# Patient Record
Sex: FEMALE | URBAN - METROPOLITAN AREA
[De-identification: names, ages, dates, MRNs, and addresses within clinical notes are randomized per-mention and may not be internally consistent; named-entity substitution may affect disease eponyms.]

---

## 2018-07-02 ENCOUNTER — APPOINTMENT (RX ONLY)
Dept: URBAN - METROPOLITAN AREA CLINIC 113 | Facility: CLINIC | Age: 63
Setting detail: DERMATOLOGY
End: 2018-07-02

## 2018-07-02 DIAGNOSIS — L82.0 INFLAMED SEBORRHEIC KERATOSIS: ICD-10-CM

## 2018-07-02 DIAGNOSIS — I83.9 ASYMPTOMATIC VARICOSE VEINS OF LOWER EXTREMITIES: ICD-10-CM

## 2018-07-02 PROBLEM — I10 ESSENTIAL (PRIMARY) HYPERTENSION: Status: ACTIVE | Noted: 2018-07-02

## 2018-07-02 PROBLEM — I83.93 ASYMPTOMATIC VARICOSE VEINS OF BILATERAL LOWER EXTREMITIES: Status: ACTIVE | Noted: 2018-07-02

## 2018-07-02 PROBLEM — E13.9 OTHER SPECIFIED DIABETES MELLITUS WITHOUT COMPLICATIONS: Status: ACTIVE | Noted: 2018-07-02

## 2018-07-02 PROBLEM — J30.1 ALLERGIC RHINITIS DUE TO POLLEN: Status: ACTIVE | Noted: 2018-07-02

## 2018-07-02 PROCEDURE — ? COUNSELING

## 2018-07-02 PROCEDURE — 17110 DESTRUCTION B9 LES UP TO 14: CPT

## 2018-07-02 PROCEDURE — ? LIQUID NITROGEN

## 2018-07-02 PROCEDURE — 99202 OFFICE O/P NEW SF 15 MIN: CPT | Mod: 25

## 2018-07-02 ASSESSMENT — LOCATION ZONE DERM: LOCATION ZONE: LEG

## 2018-07-02 ASSESSMENT — LOCATION SIMPLE DESCRIPTION DERM
LOCATION SIMPLE: LEFT THIGH
LOCATION SIMPLE: RIGHT THIGH

## 2018-07-02 ASSESSMENT — LOCATION DETAILED DESCRIPTION DERM
LOCATION DETAILED: LEFT ANTERIOR PROXIMAL THIGH
LOCATION DETAILED: RIGHT ANTERIOR PROXIMAL THIGH
LOCATION DETAILED: LEFT ANTERIOR DISTAL THIGH

## 2018-07-02 NOTE — PROCEDURE: LIQUID NITROGEN
Medical Necessity Clause: This procedure was medically necessary because the lesions that were treated were:
Number Of Freeze-Thaw Cycles: 3 freeze-thaw cycles
Consent: The patient's consent was obtained including but not limited to risks of crusting, scabbing, blistering, scarring, darker or lighter pigmentary change, recurrence, incomplete removal and infection.
Render Post-Care Instructions In Note?: yes
Medical Necessity Information: It is in your best interest to select a reason for this procedure from the list below. All of these items fulfill various CMS LCD requirements except the new and changing color options.
Post-Care Instructions: I reviewed with the patient in detail post-care instructions. Patient is to wear sunprotection, and avoid picking at any of the treated lesions. Pt may apply Vaseline to crusted or scabbing areas.
Include Z78.9 (Other Specified Conditions Influencing Health Status) As An Associated Diagnosis?: No
Detail Level: Simple
Duration Of Freeze Thaw-Cycle (Seconds): 5-10

## 2018-08-03 ENCOUNTER — APPOINTMENT (RX ONLY)
Dept: URBAN - METROPOLITAN AREA CLINIC 113 | Facility: CLINIC | Age: 63
Setting detail: DERMATOLOGY
End: 2018-08-03

## 2018-08-03 DIAGNOSIS — Z48.817 ENCOUNTER FOR SURGICAL AFTERCARE FOLLOWING SURGERY ON THE SKIN AND SUBCUTANEOUS TISSUE: ICD-10-CM

## 2018-08-03 PROCEDURE — ? POST-OP WOUND EVALUATION

## 2018-08-03 ASSESSMENT — LOCATION ZONE DERM: LOCATION ZONE: LEG

## 2018-08-03 ASSESSMENT — LOCATION SIMPLE DESCRIPTION DERM: LOCATION SIMPLE: LEFT THIGH

## 2018-08-03 ASSESSMENT — LOCATION DETAILED DESCRIPTION DERM: LOCATION DETAILED: LEFT ANTERIOR DISTAL THIGH

## 2018-08-03 NOTE — PROCEDURE: POST-OP WOUND EVALUATION
Wound Crusting?: crusted
Wound Evaluated By (Optional): Vania Felton PA-C
Add 73288 Cpt? (Important Note: In 2017 The Use Of 76847 Is Being Tracked By Cms To Determine Future Global Period Reimbursement For Global Periods): no
Detail Level: Detailed
Wound Diameter In Cm(Optional): 0
Follow Up Units (Optional): -
Wound Color?: pink

## 2024-03-29 ENCOUNTER — TELEPHONE (OUTPATIENT)
Dept: NEPHROLOGY | Facility: CLINIC | Age: 69
End: 2024-03-29

## 2024-03-29 NOTE — TELEPHONE ENCOUNTER
Pt called to schedule a new patient appt with Dr Cullen, per her Dr Maricruz Diaz [facility] for CKD 3b. Labs and notes are in the chart. Appt was scheduled on 6/4/24 with Dr Cullen in the [facility].

## 2024-06-04 ENCOUNTER — OFFICE VISIT (OUTPATIENT)
Dept: NEPHROLOGY | Facility: CLINIC | Age: 69
End: 2024-06-04
Payer: MEDICARE

## 2024-06-04 VITALS
BODY MASS INDEX: 47.8 KG/M2 | HEART RATE: 78 BPM | SYSTOLIC BLOOD PRESSURE: 130 MMHG | DIASTOLIC BLOOD PRESSURE: 70 MMHG | HEIGHT: 64 IN | WEIGHT: 280 LBS

## 2024-06-04 DIAGNOSIS — N18.9 CHRONIC RENAL IMPAIRMENT, UNSPECIFIED CKD STAGE: Primary | ICD-10-CM

## 2024-06-04 PROCEDURE — 99204 OFFICE O/P NEW MOD 45 MIN: CPT | Performed by: INTERNAL MEDICINE

## 2024-06-04 RX ORDER — FEXOFENADINE HCL 180 MG/1
180 TABLET ORAL DAILY
COMMUNITY

## 2024-06-04 RX ORDER — TORSEMIDE 20 MG/1
TABLET ORAL
COMMUNITY
Start: 2024-03-04

## 2024-06-04 RX ORDER — AMLODIPINE BESYLATE 5 MG/1
5 TABLET ORAL DAILY
COMMUNITY
Start: 2023-12-14

## 2024-06-04 RX ORDER — ACETAMINOPHEN 325 MG/1
650 TABLET ORAL
COMMUNITY
Start: 2023-12-22

## 2024-06-04 RX ORDER — GLIMEPIRIDE 2 MG/1
TABLET ORAL 2 TIMES DAILY
COMMUNITY
Start: 2022-04-15

## 2024-06-04 RX ORDER — LINAGLIPTIN 5 MG/1
TABLET, FILM COATED ORAL
COMMUNITY
Start: 2024-05-17

## 2024-06-04 RX ORDER — AZATHIOPRINE 50 MG/1
50 TABLET ORAL DAILY
COMMUNITY
Start: 2024-04-22

## 2024-06-04 RX ORDER — TACROLIMUS 1 MG/1
1 CAPSULE ORAL 2 TIMES DAILY
COMMUNITY
Start: 2024-04-02

## 2024-06-04 RX ORDER — IRBESARTAN 75 MG/1
75 TABLET ORAL
COMMUNITY
Start: 2023-09-19 | End: 2024-09-18

## 2024-06-04 NOTE — PROGRESS NOTES
Consultation - Nephrology   Valeria Mccormick 68 y.o. female MRN: [id number]  Unit/Bed#:  Encounter: [id number]      Assessment & Plan     Assessment / Plan:  1.  Renal    The patient provided an excellent history.  She appears to have recovered her renal function and her latest creatinine is 1.1.  Protein estimation on total protein is 180 mg.  So it does appear that her bout of acute kidney injury has resolved and is approaching normal.  She states that prior to her liver transplant she never was told that she had any kidney disease.    At this point I told her that tacrolimus which of course is required to prevent rejection of her liver can provide some toxicity to the kidneys over many years but dosages are The low and tolerance is generally the rule.    She is a diabetic and she has excellent glycemic control so I think at this point that is the most important thing to focus on to help avoid diabetic complications in the future.    She is doing labs every 3 months so we will monitor renal function    She does watch her salt intake and she uses diuretics minimally but I told her if she requires it because I have a feeling she may develop some lymphedema related to her extensive abdominal surgery that she can use it occasionally.    For now she will continue on her current medications with no changes.    's and follow-up as scheduled I told her to call if there is any problems or concerns before next visit.    2.  Liver transplant    The patient underwent a liver transplant at the end of 2022.  She is presently on azathioprine and tacrolimus for her immunosuppression and this is managed through the [facility].  She will continue with routine follow-up and monitoring there.    I have spent a total time of 60 minutes on 06/04/24 in caring for this patient including Diagnostic results, Prognosis, Patient and family education, Impressions, Reviewing / ordering tests, medicine, procedures  , and  Obtaining or reviewing history  .         History of Present Illness   Physician Requesting Consult: No att. providers found  Reason for Consult / Principal Problem: Renal insufficiency  Hx and PE limited by:   HPI: Valeria Mccormick is a 68 y.o. year old female who has history of diabetes.  The patient went in for a bowel obstruction surgery had that performed and then had developed postoperative liver failure.  This occurred back towards November 2022.  Subsequently she had more complications and ended up with renal failure requiring a couple dialysis treatments.  Things then seem to improve she was being monitored for chronic kidney disease at [facility].  She then ended up having an abdominal hernia repair and she states after that everything got back to normal.  She is now establishing care locally to monitor her kidney function and had no real complaints today.  History obtained from chart review and the patient.    Consults    Review of Systems   Constitutional:  Negative for chills, diaphoresis and fever.   HENT: Negative.     Eyes: Negative.    Respiratory: Negative.  Negative for cough, chest tightness, shortness of breath and wheezing.    Cardiovascular:  Positive for leg swelling. Negative for chest pain and palpitations.   Gastrointestinal:  Negative for abdominal distention, diarrhea, nausea and vomiting.   Genitourinary:  Negative for difficulty urinating, dysuria, flank pain and hematuria.   Musculoskeletal:         She has a hematoma on her left lower extremity due to a fall.   Neurological:  Negative for dizziness, light-headedness and headaches.   Psychiatric/Behavioral:  Negative for agitation, behavioral problems, confusion and decreased concentration.        Historical Information   Patient Active Problem List   Diagnosis    CRI (chronic renal insufficiency)     Past Medical History:   Diagnosis Date    Anemia 2016    Chronic kidney disease 11/29/22    Diabetes mellitus (HCC) 2012  "   Hypertension 2002   Cirrhosis history of liver transplantation November 2022.    No history of cancer stroke heart disease lung disease kidney stones.    History reviewed. No pertinent surgical history.  Social History   Social History     Substance and Sexual Activity   Alcohol Use Not Currently    Comment: stopped when diagnosed with liver disease 2017     Social History     Substance and Sexual Activity   Drug Use Never   No tobacco ethanol or drug abuse.  Social History     Tobacco Use   Smoking Status Never   Smokeless Tobacco Never     Family History   Problem Relation Age of Onset    Hypertension Mother         [employer]    Cancer Father         Bladder    Diabetes Father     Hypertension Father         Replaced Aortic Valve   No history of kidney disease that she is aware of.    Meds/Allergies   current meds:   No current facility-administered medications for this visit.     Allergies   Allergen Reactions    Statins Other (See Comments)     Bone pain       Objective   [unfilled]  Body mass index is 48.06 kg/m².    Invasive Devices:        PHYSICAL EXAM:  /70 (BP Location: Left arm, Patient Position: Sitting, Cuff Size: Standard)   Pulse 78   Ht 5' 4\" (1.626 m)   Wt 127 kg (280 lb)   BMI 48.06 kg/m²     Physical Exam  Constitutional:       General: She is not in acute distress.     Appearance: She is not ill-appearing or toxic-appearing.   HENT:      Head: Normocephalic and atraumatic.      Nose: Nose normal.      Mouth/Throat:      Mouth: Mucous membranes are moist.   Eyes:      General: No scleral icterus.     Extraocular Movements: Extraocular movements intact.   Cardiovascular:      Rate and Rhythm: Normal rate and regular rhythm.      Heart sounds: Murmur heard.      No friction rub. No gallop.      Comments: Mild edema slight swelling with bruise left lower extremity laterally.  She has been evaluated in emergency room for this and it is getting better.  Pulmonary:      Effort: Pulmonary " "effort is normal. No respiratory distress.      Breath sounds: No wheezing, rhonchi or rales.   Abdominal:      General: Bowel sounds are normal. There is no distension.      Palpations: Abdomen is soft.      Tenderness: There is no abdominal tenderness.   Musculoskeletal:      Cervical back: Normal range of motion and neck supple.   Neurological:      General: No focal deficit present.      Mental Status: She is alert and oriented to person, place, and time. Mental status is at baseline.   Psychiatric:         Mood and Affect: Mood normal.         Behavior: Behavior normal.         Thought Content: Thought content normal.           Current Weight: Weight - Scale: 127 kg (280 lb)  First Weight: Weight - Scale: 127 kg (280 lb)    Lab Results:              Invalid input(s): \"LABGLOM\"        Invalid input(s): \"LABALBU\"          "

## 2024-06-04 NOTE — PATIENT INSTRUCTIONS
You are here for your initial visit thank you so much for providing your history as it was very involved and it was very helpful.  I am so pleased to see that your kidney function now has recovered back close to normal on your last test.  The creatinine test is for the kidney function as you know.  So overall you had a very rough time but you are now recovering from your transplant your other surgeries and things are doing great    Long-term the medication sometimes can injure the kidney over many years that you use to prevent rejection specifically tacrolimus but nowadays they use low dosages and it tends to be less toxic.    The important thing now is just to maintain good blood sugar control so you do not develop diabetic kidney disease make sure your blood pressure is well-controlled.    As we get to know each other if the swelling is an issue and we feel it is related to amlodipine we may try and adjust medications in the future but for today I I really feel that there could be some other reason so hopefully as to get more active it will be better and but of course in the future if there is a problem for you see me you can call me.

## 2024-10-11 DIAGNOSIS — I10 ESSENTIAL HYPERTENSION: Primary | ICD-10-CM

## 2024-10-12 DIAGNOSIS — I10 ESSENTIAL HYPERTENSION: ICD-10-CM

## 2024-10-12 RX ORDER — IRBESARTAN 75 MG/1
75 TABLET ORAL DAILY
Qty: 90 TABLET | Refills: 3 | Status: SHIPPED | OUTPATIENT
Start: 2024-10-12 | End: 2024-10-14

## 2024-10-14 RX ORDER — IRBESARTAN 75 MG/1
75 TABLET ORAL DAILY
Qty: 90 TABLET | Refills: 3 | Status: SHIPPED | OUTPATIENT
Start: 2024-10-14 | End: 2025-10-14

## 2024-11-13 ENCOUNTER — OFFICE VISIT (OUTPATIENT)
Dept: NEPHROLOGY | Facility: CLINIC | Age: 69
End: 2024-11-13
Payer: MEDICARE

## 2024-11-13 VITALS
SYSTOLIC BLOOD PRESSURE: 139 MMHG | HEART RATE: 80 BPM | WEIGHT: 290 LBS | BODY MASS INDEX: 49.51 KG/M2 | DIASTOLIC BLOOD PRESSURE: 80 MMHG | HEIGHT: 64 IN

## 2024-11-13 DIAGNOSIS — N18.9 CHRONIC RENAL IMPAIRMENT, UNSPECIFIED CKD STAGE: Primary | ICD-10-CM

## 2024-11-13 PROCEDURE — 99214 OFFICE O/P EST MOD 30 MIN: CPT | Performed by: INTERNAL MEDICINE

## 2024-11-13 RX ORDER — DULAGLUTIDE 3 MG/.5ML
INJECTION, SOLUTION SUBCUTANEOUS
COMMUNITY

## 2024-11-13 NOTE — PATIENT INSTRUCTIONS
You are here for follow-up it was really nice to see you again I am glad that your health is doing well but you did tell me you have gained weight you are really trying to focus on weight loss through medicines and potentially gastric sleeve if that is unsuccessful but you are working on it.  With respect to the kidney function the creatinine is one of the best it has been at 1.1 thank you for reviewing your reminding me of what you went through with your kidneys but as I tell you things have recovered back to what your preoperative levels were close to.    Underneath it all you have very little protein in the urine so tells me you do not have diabetic kidney disease you do not have significant intrinsic disease so at this point is really just focusing on sugar control weight loss could be helpful and monitoring with blood pressure control and routine health management.    You get labs done every 3 months you can have them try and forward to me to observe but certainly call me if there is any change in your creatinine or you have any concerns and we can follow-up in 6 months.

## 2024-11-13 NOTE — PROGRESS NOTES
NEPHROLOGY PROGRESS NOTE    Valeria Mccormick 69 y.o. female MRN: [id number]  Unit/Bed#:  Encounter: [id number]  Reason for Consult: Renal insufficiency.    The patient is here for follow-up she really looks well says her health is been doing well and she is focused on trying to lose some weight.  Otherwise she has not had any recurrent illnesses and has no specific complaints for me today.  She states that her liver transplant and follow-up have been doing excellent.    ASSESSMENT/PLAN:      1.  Renal    The patient has a history of a liver transplant a couple years ago and really reviewing her history looking at labs post procedure she had issues with acute kidney injury and the creatinine was staying elevated for a while.  She even told me to the point they were beginning to place a fistula for her.  Fortunately she is improved dramatically and her creatinine is 1.1 which is better than it was 6 months ago or should I just say relatively stable.  In the past she does not have significant proteinuria so this is likely her baseline.  Underlying that she is diabetic but this is not diabetic nephropathy as urine protein estimations were less than 300 mg in the past.  I just told her now there is a chance the tacrolimus can affect her kidneys over years but of course it is necessary for her immunosuppression given her liver transplant and she should just focus on weight loss which helps reduce hyperfiltration injury which she is interested in.  Also just routine health management and sugar control to reduce diabetic complication risks in the future.    Continue current medications  Recommendations as above    Labs and follow-up as scheduled I told her to call me if there is any problems before next visit.    I have spent a total time of 30 minutes in caring for this patient on the day of the visit/encounter including Diagnostic results, Prognosis, Impressions, Reviewing / ordering tests, medicine, procedures  , and  Obtaining or reviewing history  .     SUBJECTIVE:  Review of Systems   Constitutional: Negative for chills, diaphoresis and fever.   HENT: Negative.     Eyes: Negative.    Cardiovascular:  Negative for chest pain, dyspnea on exertion and orthopnea.   Respiratory:  Negative for cough, shortness of breath and sputum production.    Gastrointestinal:  Negative for abdominal pain, diarrhea, nausea and vomiting.   Genitourinary: Negative.    Neurological:  Negative for dizziness and headaches.   Psychiatric/Behavioral:  Negative for altered mental status, depression, hallucinations and hypervigilance.        OBJECTIVE:  Current Weight:    Vitals:@TMAX(24)@Current:     There were no vitals taken for this visit. , There is no height or weight on file to calculate BMI.    [unfilled]    Physical Exam: There were no vitals taken for this visit.  Physical Exam  Constitutional:       General: She is not in acute distress.     Appearance: She is not toxic-appearing.   HENT:      Head: Normocephalic and atraumatic.      Nose: Nose normal.      Mouth/Throat:      Mouth: Mucous membranes are moist.   Eyes:      General: No scleral icterus.     Extraocular Movements: Extraocular movements intact.   Cardiovascular:      Rate and Rhythm: Normal rate and regular rhythm.      Heart sounds:      No gallop.   Pulmonary:      Effort: Pulmonary effort is normal. No respiratory distress.      Breath sounds: No wheezing or rales.   Abdominal:      General: Bowel sounds are normal. There is no distension.      Tenderness: There is no abdominal tenderness.   Neurological:      Mental Status: She is alert and oriented to person, place, and time. Mental status is at baseline.   Psychiatric:         Mood and Affect: Mood normal.         Behavior: Behavior normal.         Medications:    Current Outpatient Medications:     acetaminophen (TYLENOL) 325 mg tablet, Take 650 mg by mouth, Disp: , Rfl:     amlodipine (NORVASC) 1 mg/ml SUSP oral suspension,  ", Disp: , Rfl:     amLODIPine (NORVASC) 5 mg tablet, Take 5 mg by mouth daily, Disp: , Rfl:     azaTHIOprine (IMURAN) 50 mg tablet, Take 50 mg by mouth daily, Disp: , Rfl:     Cyanocobalamin 1000 MCG CAPS, Take 1,000 capsules by mouth, Disp: , Rfl:     fexofenadine (ALLEGRA) 180 MG tablet, Take 180 mg by mouth daily, Disp: , Rfl:     glimepiride (AMARYL) 2 mg tablet, 2 (two) times a day, Disp: , Rfl:     irbesartan (AVAPRO) 75 mg tablet, TAKE 1 TABLET (75 MG TOTAL) BY MOUTH DAILY PATIENT TAKES 0.5 TABLET., Disp: 90 tablet, Rfl: 3    tacrolimus (PROGRAF) 1 mg capsule, Take 1 mg by mouth 2 (two) times a day, Disp: , Rfl:     torsemide (DEMADEX) 20 mg tablet, if needed., Disp: , Rfl:     Tradjenta 5 MG TABS, , Disp: , Rfl:     Laboratory Results:  No results found for: \"WBC\", \"HGB\", \"HCT\", \"MCV\", \"PLT\"  Lab Results   Component Value Date    SODIUM 134 (L) 10/03/2024    K 5.0 10/03/2024     10/03/2024    CO2 24 10/03/2024    BUN 21 10/03/2024    CREATININE 1.19 (H) 10/03/2024    GLUC 156 (H) 10/03/2024    CALCIUM 9.2 10/03/2024     Lab Results   Component Value Date    CALCIUM 9.2 10/03/2024    PHOS 3.0 12/23/2023     No results found for: \"LABPROT\"      "

## 2025-05-22 ENCOUNTER — TELEPHONE (OUTPATIENT)
Dept: NEPHROLOGY | Facility: CLINIC | Age: 70
End: 2025-05-22

## 2025-05-22 DIAGNOSIS — N18.9 CHRONIC RENAL IMPAIRMENT, UNSPECIFIED CKD STAGE: Primary | ICD-10-CM

## 2025-05-22 NOTE — TELEPHONE ENCOUNTER
----- Message from Jose Cullen MD sent at 5/21/2025  2:24 PM EDT -----  BMP  ----- Message -----  From: Mariposa Babin MA  Sent: 5/21/2025   1:07 PM EDT  To: Jose Cullen MD    What labs would you like drawn for the follow up?

## 2025-05-28 ENCOUNTER — OFFICE VISIT (OUTPATIENT)
Dept: NEPHROLOGY | Facility: CLINIC | Age: 70
End: 2025-05-28
Payer: MEDICARE

## 2025-05-28 VITALS
HEART RATE: 74 BPM | HEIGHT: 64 IN | BODY MASS INDEX: 49.85 KG/M2 | DIASTOLIC BLOOD PRESSURE: 84 MMHG | WEIGHT: 292 LBS | SYSTOLIC BLOOD PRESSURE: 144 MMHG

## 2025-05-28 DIAGNOSIS — N18.9 CHRONIC RENAL IMPAIRMENT, UNSPECIFIED CKD STAGE: Primary | ICD-10-CM

## 2025-05-28 DIAGNOSIS — R80.9 MICROALBUMINURIA: ICD-10-CM

## 2025-05-28 PROCEDURE — 99214 OFFICE O/P EST MOD 30 MIN: CPT | Performed by: INTERNAL MEDICINE

## 2025-05-28 RX ORDER — SEMAGLUTIDE 0.68 MG/ML
INJECTION, SOLUTION SUBCUTANEOUS
COMMUNITY
Start: 2024-12-18

## 2025-05-28 NOTE — ASSESSMENT & PLAN NOTE
Patient is a diabetic and has microalbuminuria.  Her latest albumin screen showed 120 so she is still in that range and has not progressed to overt nephropathy.  Continue with her ARB and glycemic control.    I have spent a total time of 30 minutes in caring for this patient on the day of the visit/encounter including Diagnostic results, Prognosis, Impressions, Reviewing/placing orders in the medical record (including tests, medications, and/or procedures), and Obtaining or reviewing history  .

## 2025-05-28 NOTE — ASSESSMENT & PLAN NOTE
Lab Results   Component Value Date    EGFR 47 (L) 03/20/2025    EGFR 40 (L) 01/09/2025    EGFR 49 (L) 10/03/2024    CREATININE 1.25 (H) 03/20/2025    CREATININE 1.41 (H) 01/09/2025    CREATININE 1.19 (H) 10/03/2024     Patient is chronic renal insufficiency her latest creatinine is 1.2 and it seems that she fluctuates between 1.2-1.4 generally.  The patient has a liver transplant about 3 years ago and she reminded me that post transplant her kidney function was very poor and they actually were thinking she may end up on dialysis need a kidney transplant but things are remarkably improved.  I suspect she has mild nephrosclerosis.  She is only been on tacrolimus for a few years and creatinines been stable so I do not know how much of an intrinsic process has developed from that but it does not seem to be significant especially given lower levels of proteinuria.    Also I explained to her that she does not have diabetic nephropathy this point as the quantitation of protein that is estimated in your urine is still in what we call the microalbumin range so at this point it is more of a warning sign as opposed to overt nephropathy.    I stressed her the importance of glycemic control maintaining A1c 7% or lower.  Blood pressure control lipid control and she is on an ARB.    Continue current medications  She monitors labs every 3 months through her transplant team  Told to call me if there is any problems or concerns before next visit we will see her back in 6 months.

## 2025-05-28 NOTE — PROGRESS NOTES
Name: Valeria Mccormick      : 1955      MRN: [id number]  Encounter Provider: Jose Cullen MD  Encounter Date: 2025   Encounter department: [facility]  :  Assessment & Plan  Chronic renal impairment, unspecified CKD stage  Lab Results   Component Value Date    EGFR 47 (L) 2025    EGFR 40 (L) 2025    EGFR 49 (L) 10/03/2024    CREATININE 1.25 (H) 2025    CREATININE 1.41 (H) 2025    CREATININE 1.19 (H) 10/03/2024     Patient is chronic renal insufficiency her latest creatinine is 1.2 and it seems that she fluctuates between 1.2-1.4 generally.  The patient has a liver transplant about 3 years ago and she reminded me that post transplant her kidney function was very poor and they actually were thinking she may end up on dialysis need a kidney transplant but things are remarkably improved.  I suspect she has mild nephrosclerosis.  She is only been on tacrolimus for a few years and creatinines been stable so I do not know how much of an intrinsic process has developed from that but it does not seem to be significant especially given lower levels of proteinuria.    Also I explained to her that she does not have diabetic nephropathy this point as the quantitation of protein that is estimated in your urine is still in what we call the microalbumin range so at this point it is more of a warning sign as opposed to overt nephropathy.    I stressed her the importance of glycemic control maintaining A1c 7% or lower.  Blood pressure control lipid control and she is on an ARB.    Continue current medications  She monitors labs every 3 months through her transplant team  Told to call me if there is any problems or concerns before next visit we will see her back in 6 months.       Microalbuminuria    Patient is a diabetic and has microalbuminuria.  Her latest albumin screen showed 120 so she is still in that range and has not progressed to overt nephropathy.   "Continue with her ARB and glycemic control.    I have spent a total time of 30 minutes in caring for this patient on the day of the visit/encounter including Diagnostic results, Prognosis, Impressions, Reviewing/placing orders in the medical record (including tests, medications, and/or procedures), and Obtaining or reviewing history  .              History of Present Illness   HPI  Valeria Mccormick is a 69 y.o. female who presents for routine follow-up.  The patient states that she was having significant arthralgias in her knees but has undergone 2 gel injections and it seems to have helped the pain and she was able to walk a little better.  She is also an avid swimmer and exerciser and that is really been helping.  She is also hoping to lose weight and has tried different medication she is now on Ozempic and is frustrated but I told her that the weight slowly comes off and hopefully she should stick with that.  Otherwise no complaints.  History obtained from: patient    Review of Systems   Constitutional:  Negative for chills and fatigue.   HENT: Negative.     Eyes: Negative.    Respiratory:  Negative for cough, chest tightness, shortness of breath and wheezing.    Cardiovascular:  Negative for chest pain and leg swelling.   Gastrointestinal:  Negative for abdominal pain, diarrhea, nausea and vomiting.   Genitourinary: Negative.    Musculoskeletal:  Positive for arthralgias.   Neurological:  Negative for dizziness and headaches.   Psychiatric/Behavioral:  Negative for agitation, behavioral problems, confusion and decreased concentration.           Objective   /84 (BP Location: Right arm, Patient Position: Sitting, Cuff Size: Standard)   Pulse 74   Ht 5' 4\" (1.626 m)   Wt 132 kg (292 lb)   BMI 50.12 kg/m²      Physical Exam  Constitutional:       General: She is not in acute distress.     Appearance: She is not ill-appearing or toxic-appearing.   HENT:      Head: Normocephalic and atraumatic.      " Mouth/Throat:      Mouth: Mucous membranes are moist.     Eyes:      Extraocular Movements: Extraocular movements intact.       Cardiovascular:      Rate and Rhythm: Normal rate and regular rhythm.      Heart sounds:      No gallop.   Pulmonary:      Effort: Pulmonary effort is normal. No respiratory distress.      Breath sounds: No wheezing or rales.   Abdominal:      General: There is no distension.      Palpations: Abdomen is soft.      Tenderness: There is no abdominal tenderness.     Neurological:      General: No focal deficit present.      Mental Status: She is alert and oriented to person, place, and time.     Psychiatric:         Mood and Affect: Mood normal.         Behavior: Behavior normal.